# Patient Record
Sex: MALE | Race: WHITE | NOT HISPANIC OR LATINO | Employment: FULL TIME | URBAN - METROPOLITAN AREA
[De-identification: names, ages, dates, MRNs, and addresses within clinical notes are randomized per-mention and may not be internally consistent; named-entity substitution may affect disease eponyms.]

---

## 2019-05-25 ENCOUNTER — HOSPITAL ENCOUNTER (EMERGENCY)
Facility: HOSPITAL | Age: 39
Discharge: HOME | End: 2019-05-25
Attending: EMERGENCY MEDICINE | Admitting: EMERGENCY MEDICINE
Payer: OTHER MISCELLANEOUS

## 2019-05-25 VITALS
OXYGEN SATURATION: 97 % | SYSTOLIC BLOOD PRESSURE: 159 MMHG | HEART RATE: 58 BPM | TEMPERATURE: 97.8 F | RESPIRATION RATE: 18 BRPM | WEIGHT: 280 LBS | DIASTOLIC BLOOD PRESSURE: 98 MMHG

## 2019-05-25 DIAGNOSIS — S01.81XA FACIAL LACERATION: Primary | ICD-10-CM

## 2019-05-25 PROCEDURE — 99283 EMERGENCY DEPT VISIT LOW MDM: CPT

## 2019-05-25 PROCEDURE — 90471 IMMUNIZATION ADMIN: CPT

## 2019-05-25 PROCEDURE — 90715 TDAP VACCINE 7 YRS/> IM: CPT | Performed by: EMERGENCY MEDICINE

## 2019-05-25 PROCEDURE — 99283 EMERGENCY DEPT VISIT LOW MDM: CPT | Performed by: EMERGENCY MEDICINE

## 2019-05-25 RX ORDER — BACITRACIN, NEOMYCIN, POLYMYXIN B 400; 3.5; 5 [USP'U]/G; MG/G; [USP'U]/G
1 OINTMENT TOPICAL ONCE
Status: COMPLETED | OUTPATIENT
Start: 2019-05-25 | End: 2019-05-25

## 2019-05-25 RX ADMIN — BACITRACIN, NEOMYCIN, POLYMYXIN B 1 SMALL APPLICATION: 400; 3.5; 5 OINTMENT TOPICAL at 01:19

## 2019-05-25 RX ADMIN — TETANUS TOXOID, REDUCED DIPHTHERIA TOXOID AND ACELLULAR PERTUSSIS VACCINE, ADSORBED 0.5 ML: 5; 2.5; 8; 8; 2.5 SUSPENSION INTRAMUSCULAR at 01:19

## 2019-10-08 ENCOUNTER — OFFICE VISIT (OUTPATIENT)
Dept: URGENT CARE | Facility: CLINIC | Age: 39
End: 2019-10-08

## 2019-10-08 VITALS
SYSTOLIC BLOOD PRESSURE: 126 MMHG | BODY MASS INDEX: 30.84 KG/M2 | WEIGHT: 248 LBS | DIASTOLIC BLOOD PRESSURE: 72 MMHG | HEIGHT: 75 IN

## 2019-10-08 DIAGNOSIS — Z01.89 ENCOUNTER FOR TOBACCO USE SCREENING: Primary | ICD-10-CM

## 2019-10-08 DIAGNOSIS — Z13.6 SCREENING FOR CARDIOVASCULAR CONDITION: Primary | ICD-10-CM

## 2019-10-08 DIAGNOSIS — Z13.6 SCREENING FOR CARDIOVASCULAR CONDITION: ICD-10-CM

## 2019-10-08 LAB
CHOLEST SERPL-MCNC: 118 MG/DL (ref 50–200)
GLUCOSE SERPL-MCNC: 94 MG/DL (ref 65–140)
HDLC SERPL-MCNC: 49 MG/DL (ref 40–60)
LDLC SERPL CALC-MCNC: 57 MG/DL (ref 0–100)
NONHDLC SERPL-MCNC: 69 MG/DL
TRIGL SERPL-MCNC: 62 MG/DL

## 2019-10-08 PROCEDURE — 82947 ASSAY GLUCOSE BLOOD QUANT: CPT | Performed by: NURSE PRACTITIONER

## 2019-10-08 PROCEDURE — 80323 ALKALOIDS NOS: CPT | Performed by: NURSE PRACTITIONER

## 2019-10-08 PROCEDURE — 80061 LIPID PANEL: CPT | Performed by: NURSE PRACTITIONER

## 2019-10-08 NOTE — PROGRESS NOTES
Biometric screening only       /72   Ht 6' 3" (1 905 m)   Wt 112 kg (248 lb)   BMI 31 00 kg/m²   Waist: 41 25in

## 2019-10-15 LAB
COTININE SERPL-MCNC: NORMAL NG/ML
NICOTINE SERPL-MCNC: NORMAL NG/ML

## 2019-10-18 ENCOUNTER — TELEPHONE (OUTPATIENT)
Dept: URGENT CARE | Facility: CLINIC | Age: 39
End: 2019-10-18

## 2020-11-10 ENCOUNTER — OFFICE VISIT (OUTPATIENT)
Dept: URGENT CARE | Facility: CLINIC | Age: 40
End: 2020-11-10
Payer: COMMERCIAL

## 2020-11-10 VITALS
HEIGHT: 75 IN | OXYGEN SATURATION: 96 % | RESPIRATION RATE: 20 BRPM | BODY MASS INDEX: 31.08 KG/M2 | TEMPERATURE: 97.1 F | HEART RATE: 55 BPM | WEIGHT: 250 LBS | DIASTOLIC BLOOD PRESSURE: 78 MMHG | SYSTOLIC BLOOD PRESSURE: 136 MMHG

## 2020-11-10 DIAGNOSIS — Z11.59 SPECIAL SCREENING EXAMINATION FOR UNSPECIFIED VIRAL DISEASE: ICD-10-CM

## 2020-11-10 DIAGNOSIS — J20.9 ACUTE BRONCHITIS, UNSPECIFIED ORGANISM: Primary | ICD-10-CM

## 2020-11-10 PROCEDURE — U0003 INFECTIOUS AGENT DETECTION BY NUCLEIC ACID (DNA OR RNA); SEVERE ACUTE RESPIRATORY SYNDROME CORONAVIRUS 2 (SARS-COV-2) (CORONAVIRUS DISEASE [COVID-19]), AMPLIFIED PROBE TECHNIQUE, MAKING USE OF HIGH THROUGHPUT TECHNOLOGIES AS DESCRIBED BY CMS-2020-01-R: HCPCS | Performed by: PHYSICIAN ASSISTANT

## 2020-11-10 PROCEDURE — 99213 OFFICE O/P EST LOW 20 MIN: CPT | Performed by: PHYSICIAN ASSISTANT

## 2020-11-10 RX ORDER — PREDNISONE 50 MG/1
50 TABLET ORAL DAILY
Qty: 5 TABLET | Refills: 0 | Status: SHIPPED | OUTPATIENT
Start: 2020-11-10 | End: 2020-11-15

## 2020-11-11 LAB — SARS-COV-2 RNA SPEC QL NAA+PROBE: NOT DETECTED

## 2020-11-13 ENCOUNTER — TELEPHONE (OUTPATIENT)
Dept: URGENT CARE | Facility: CLINIC | Age: 40
End: 2020-11-13

## 2021-03-09 ENCOUNTER — OFFICE VISIT (OUTPATIENT)
Dept: DERMATOLOGY | Facility: CLINIC | Age: 41
End: 2021-03-09
Payer: COMMERCIAL

## 2021-03-09 VITALS — TEMPERATURE: 97 F | WEIGHT: 293 LBS | HEIGHT: 75 IN | BODY MASS INDEX: 36.43 KG/M2

## 2021-03-09 DIAGNOSIS — L90.5 SCAR: Primary | ICD-10-CM

## 2021-03-09 DIAGNOSIS — L81.4 SOLAR LENTIGO: ICD-10-CM

## 2021-03-09 DIAGNOSIS — D22.9 MULTIPLE NEVI: ICD-10-CM

## 2021-03-09 DIAGNOSIS — L57.0 ACTINIC KERATOSES: ICD-10-CM

## 2021-03-09 PROCEDURE — 99204 OFFICE O/P NEW MOD 45 MIN: CPT | Performed by: STUDENT IN AN ORGANIZED HEALTH CARE EDUCATION/TRAINING PROGRAM

## 2021-03-09 PROCEDURE — 3008F BODY MASS INDEX DOCD: CPT | Performed by: STUDENT IN AN ORGANIZED HEALTH CARE EDUCATION/TRAINING PROGRAM

## 2021-03-09 PROCEDURE — 1036F TOBACCO NON-USER: CPT | Performed by: STUDENT IN AN ORGANIZED HEALTH CARE EDUCATION/TRAINING PROGRAM

## 2021-03-09 RX ORDER — CALCIPOTRIENE 50 UG/G
CREAM TOPICAL
Qty: 60 G | Refills: 0 | Status: SHIPPED | OUTPATIENT
Start: 2021-03-09

## 2021-03-09 RX ORDER — FLUOROURACIL 50 MG/G
CREAM TOPICAL
Qty: 40 G | Refills: 0 | Status: SHIPPED | OUTPATIENT
Start: 2021-03-09

## 2021-03-09 NOTE — PROGRESS NOTES
[facility] Note     Patient Name: Quinten Dodson  Encounter Date: 03/09/2021     Have you been cared for by a [facility] Dermatologist in the last 3 years and, if so, which one? No    · Have you traveled outside of the [address] [place] [address] in the past 3 months or outside of the [facility] area in the last 2 weeks? No     May we call your Preferred Phone number to discuss your specific medical information? Yes     May we leave a detailed message that includes your specific medical information? Yes      Today's Chief Concerns:   Concern #1:  Spot on right cheek   Concern #2:      Past Medical History:  Have you personally ever had or currently have any of the following? · Skin cancer (such as Melanoma, Basal Cell Carcinoma, Squamous Cell Carcinoma? (If Yes, please provide more detail)- No  · Eczema: No  · Psoriasis: No  · HIV/AIDS: No  · Hepatitis B or C: No  · Tuberculosis: No  · Systemic Immunosuppression such as Diabetes, Biologic or Immunotherapy, Chemotherapy, Organ Transplantation, Bone Marrow Transplantation (If YES, please provide more detail): No  · Radiation Treatment (If YES, please provide more detail): No  · Any other major medical conditions/concerns? (If Yes, which types)- YES, asthma    Social History:     What is/was your primary occupation?      What are your hobbies/past-times? Family History:  Have any of your "first degree relatives" (parent, brother, sister, or child) had any of the following       · Skin cancer such as Melanoma or Merkel Cell Carcinoma or Pancreatic Cancer? No  · Eczema, Asthma, Hay Fever or Seasonal Allergies: No  · Psoriasis or Psoriatic Arthritis: No  · Do any other medical conditions seem to run in your family? If Yes, what condition and which relatives?   YES, mother:marco a     Current Medications:   (please update all dermatological medications before printing patient's AVS!)      Current Outpatient Medications:     fluticasone-salmeterol (ADVAIR, WIXELA) 100-50 mcg/dose inhaler, Inhale 1 puff 2 (two) times a day Rinse mouth after use , Disp: , Rfl:       Review of Systems:  Have you recently had or currently have any of the following? If YES, what are you doing for the problem? · Fever, chills or unintended weight loss: No  · Sudden loss or change in your vision: No  · Nausea, vomiting or blood in your stool: No  · Painful or swollen joints: No  · Wheezing or cough: No  · Changing mole or non-healing wound: No  · Nosebleeds: No  · Excessive sweating: No  · Easy or prolonged bleeding? No  · Over the last 2 weeks, how often have you been bothered by the following problems? · Taking little interest or pleasure in doing things: 1 - Not at All  · Feeling down, depressed, or hopeless: 1 - Not at All  · Rapid heartbeat with epinephrine:  No    · FEMALES ONLY:    · Are you pregnant or planning to become pregnant? N/A  · Are you currently or planning to be nursing or breast feeding? N/A    · Any known allergies? Allergies   Allergen Reactions    Amoxicillin     Ceclor [Cefaclor]          Physical Exam:     Was a chaperone (Derm Clinical Assistant) present throughout the entire Physical Exam? Yes     Did the Dermatology Team specifically  the patient on the importance of a Full Skin Exam to be sure that nothing is missed clinically?  Yes}  o Did the patient ultimately request or accept a Full Skin Exam?  Yes  o Did the patient specifically refuse to have the areas "under-the-bra" examined by the Dermatologist? No  o Did the patient specifically refuse to have the areas "under-the-underwear" examined by the Dermatologist? No    CONSTITUTIONAL:   Vitals:    03/09/21 1316   Temp: (!) 97 °F (36 1 °C)   Weight: 133 kg (293 lb)   Height: 6' 3" (1 905 m)           PSYCH: Normal mood and affect  EYES: Normal conjunctiva  ENT: Normal lips and oral mucosa  CARDIOVASCULAR: No edema  RESPIRATORY: Normal respirations  HEME/LYMPH/IMMUNO:  No regional lymphadenopathy except as noted below in "ASSESSMENT AND PLAN BY DIAGNOSIS"    SKIN:  FULL ORGAN SYSTEM EXAM   Hair, Scalp, Ears, Face Normal except as noted below in Assessment   Neck, Cervical Chain Nodes Normal except as noted below in Assessment   Right Arm/Hand/Fingers Normal except as noted below in Assessment   Left Arm/Hand/Fingers Normal except as noted below in Assessment   Chest/Breasts/Axillae Viewed areas Normal except as noted below in Assessment   Abdomen, Umbilicus Normal except as noted below in Assessment   Back/Spine Normal except as noted below in Assessment   Groin/Genitalia/Buttocks NOT EXAMINED   Right Leg, Foot, Toes Normal except as noted below in Assessment   Left Leg, Foot, Toes Normal except as noted below in Assessment        Assessment and Plan by Diagnosis:    History of Present Condition:     Duration:  How long has this been an issue for you?    o  few years   Location Affected:  Where on the body is this affecting you?    o  right cheek   Quality:  Is there any bleeding, pain, itch, burning/irritation, or redness associated with the skin lesion? o  denies   Severity:  Describe any bleeding, pain, itch, burning/irritation, or redness on a scale of 1 to 10 (with 10 being the worst)  o  n/a   Timing:  Does this condition seem to be there pretty constantly or do you notice it more at specific times throughout the day?     o  comes and goes   Context:  Have you ever noticed that this condition seems to be associated with specific activities you do?    o  mimi   Modifying Factors:    o Anything that seems to make the condition worse?    -  sunlight  o What have you tried to do to make the condition better?    -  denies   Associated Signs and Symptoms:  Does this skin lesion seem to be associated with any of the following:  o  SL AMB DERM SIGNS AND SYMPTOMS: Skin color changes     HYPOPIGMENTED SCAR  Physical Exam:   Anatomic Location Affected:  Right cheek   Morphological Description:  hypopigmented atrophic scar with surrounding light tan pigment and background erythema no pigment network under dermatoscopy, similar change in pigmentation on left cheek   Pertinent Positives:   Pertinent Negatives: Additional History of Present Condition:  Patient stats he had the spot over the years and notices it becomes darker with sun exposure  Assessment and Plan:  Based on a thorough discussion of this condition and the management approach to it (including a comprehensive discussion of the known risks, side effects and potential benefits of treatment), the patient (family) agrees to implement the following specific plan:   Discussed possible AK in past although at this time discussed scar is accentuating change in pigmentation he has on this cheek   Discussed cryotherapy vs topical field therapy if spot becomes scaly        SOLAR LENTIGINES      Physical Exam:   Anatomic Location Affected:  Sun exposed areas of back, chest, arms, legs   Morphological Description:  Multiple scattered brown to tan evenly pigmented macules    Denies pain, itch, bleeding  No treatments tried  Present for months - years  Reports getting newer lesions with sun exposure  Assessment and Plan:  Based on a thorough discussion of this condition and the management approach to it (including a comprehensive discussion of the known risks, side effects and potential benefits of treatment), the patient (family) agrees to implement the following specific plan:  · Reassure benign  · Use sun protection  Apply SPF 30 or higher at least three times a day  Wear sun protecting clothing and hats           MULTIPLE MELANOCYTIC NEVI ("Moles")     Physical Exam:  · Anatomic Location Affected: Trunk and extremities  · Morphological Description:  Scattered, round to ovoid, symmetrical-appearing, even bordered, skin colored to dark brown macules/papules  · Left buttocks, 3 x 2 5 mm blue macule   · Denies pain, itch, bleeding  No treatments tried  Present for years  Present constantly; no modifying factors which make it worse or better  Denies actively changing or growing moles  Assessment and Plan:  Based on a thorough discussion of this condition and the management approach to it (including a comprehensive discussion of the known risks, side effects and potential benefits of treatment), the patient (family) agrees to implement the following specific plan:  · Reassure benign  · Monitor for changes  · Use sun protection  Apply SPF 30 or higher at least three times a day  Wear sun protecting clothing and hats  Worrisome signs of skin malignancy discussed, questions answered  Regular self-skin check discussed  Advised to call or return to office if patient notices any spots of concern, rapidly growing/changing lesions, bleeding lesions, non-healing lesions  Advised regular SPF use  ACTINIC KERATOSIS    Physical Exam:   Anatomic Location Affected:  Forehead, bilateral cheeks and nose   Morphological Description:  Red scaly thin gritty papules    Additional History of Present Condition:  Patient denies any family history of skin cancer    Assessment and Plan:  Based on a thorough discussion of this condition and the management approach to it (including a comprehensive discussion of the known risks, side effects and potential benefits of treatment), the patient (family) agrees to implement the following specific plan:     Discussed LN2 vs topical field therapy  Elect for field therapy   Start Efudex cream and calcipotriene cream- mix equal amounts of both in hand and apply to forehead, cheeks and nose  Avoid applying anything else to treated area for 1 hour after  Wash hands after use  Keep out of reach of children and pets  Use for 10 days then stop   If you get open areas on treated areas, then use Vaseline until healed       Actinic keratoses are very common on sites repeatedly exposed to the sun, especially the backs of the hands and the face  They are considered precancers and have a low risk of turning into squamous cell carcinoma  It is rare for a solitary actinic keratosis to evolve into a squamous cell carcinoma (SCC), but the risk is 10-15% when more than 10 actinic keratoses are present  A tender, thickened, ulcerated or enlarging actinic keratosis is suspicious of SCC  Actinic keratoses may be prevented by strict sun protection  If already present, keratoses may improve with a very high sun protection factor (50+) broad-spectrum sunscreen applied at least daily to affected areas, year-round  We recommend that sun protective clothing and hats and sunglasses be worn whenever possible  Note that you can make you own UPF 30 rate clothing using just your own washing machine with a product called sun guard    EFUDEX (5-Fluorouracil, 5-U)     1  Efudex is a chemotherapy medication when taken by mouth; however, in dermatology we use it to treat skin conditions such as warts, some skin cancers and most commonly actinic (solar) keratoses  When applied topically the medication is not absorbed into the bloodstream and does not cause typical chemotherapy side effects  It is safe to your body  2  Efudex works by destroying the damaged cells on your skin  As a result it causes redness, irritation, and scabbing  This is a normal part of therapeutic skin response to Efudex  3  The treated areas need to be completely protected from the sun during the treatment and the recovery process  If you need to go out into the sun, cover the area with a hat, long sleeve shirts, gloves, etc    4  Treatment approaches very depending on the condition that is being treated  Your doctor will specify which approach is right for you     Instructions for Use :  Continuous and daily therapy over a region of the skin   ·  This approach is used when you have widespread involvement over an area   ·  Efudex cream and calcipotriene cream- mix equal amounts of both in hand and apply to forehead, cheeks and nose  Avoid applying anything else to treated area for 1 hour after  Wash hands after use  Keep out of reach of children and pets  Use for 10 days then stop  ·  One hour or so after application of Efudex, use plain VASELINE or AQUAPHOR  over entire treated area  This is especially important once you start to have areas react to the Efudex (red, open areas)  It helps treatment be more tolerable and speeds healing! What to Expect During Therapy   ·  Redness is caused by inflammation and destruction of the abnormal cells and indicates your lesions are responding to the treatment  You may use an over-the-counter 1% hydrocortisone ointment to decrease redness, although the treatment may be somewhat more effective if you are able to avoid cortisone use  It takes 2-4 weeks for the redness to fade after stopping the Efudex; most of the redness resolves over the first 1-2 weeks  ·  Possible Crusting and peeling occurs as the sun-damaged skin is removed to allow for replacement by normal skin  Cool washcloth soaks (washcloth soaked with cool water over face for 15-20 minutes 3-4 times per day) can help as well as a moisturizing ointment, such as Vaseline or Aquaphor ointment  It is preferable to be moisturized rather than have the skin be dry  It is fine to wash your face with plain water or a mild cleanser such as Dove or Cetaphil during your treatment course  ·  Itching and tenderness can be controlled with acetaminophen (Tylenol) or non-steroidal anti- inflammatory medication (ibuprofen, naproxen)   In Addition   ·  You can apply the Efudex with a Q-tip (if spot treating), gloves or fingers  If fingers are used, however, remember to wash your hands thoroughly afterwards to avoid irritation on normal skin     ·  Care must be taken with Efudex during logan months because the medicine is reactive with the sun   ·  Do NOT use Efudex on eyelids or lips unless specifically directed to do so  Keep Efudex away from pets and kids it IS a chemotherapy medication when taken by mouth and we do not want them eating the medicine accidentally! ·  Care must be taken with Efudex in skin fold areas like the fold from the nose to the corner of the mouth  Try to avoid having any Efudex accumulate in those areas  ·  Not all bumps will respond to the Efudex  There are benign types of keratoses that will not react to the medication (such as seborrheic keratoses)  If the area treated is not getting red it does not mean the medicine isn't working  ·  Consider the Efudex as an investment in the health of your skin; the benefits of sticking it out are worth it! Efudex is an excellent way to reverse many years of sun damage  When to contact your provider   ·  Once you have completed your prescribed course of therapy, wait for a period of 4 weeks to see if treated areas resolve  If you have lesions that have not responded, make a follow-up appointment with your doctor in about 8 weeks  In evaluating unresponsive areas your doctor will need the skin completely settled down this usually takes a period of at least two months  ·  If having extensive burning, itching, swelling or tenderness call the Dermatology Department to arrange an Efudex check with our nurse, who has extensive experience with Efudex treatment  Although the Efudex treatment sometimes produces dramatic redness, crusting and peeling, problems such as allergic reactions and infection are rare        If you have any questions please call our office at [phone]          Scribe Attestation    I,:  [employer] am acting as a scribe while in the presence of the attending physician :       I,:  Yasir Martin MD personally performed the services described in this documentation    as scribed in my presence :

## 2021-03-09 NOTE — PATIENT INSTRUCTIONS
Based on a thorough discussion of this condition and the management approach to it (including a comprehensive discussion of the known risks, side effects and potential benefits of treatment), the patient (family) agrees to implement the following specific plan:     Discussed LN2 vs topical field therapy  Elect for field therapy   Start Efudex cream and calcipotriene cream- mix equal amounts of both in hand and apply to forehead, cheeks and nose  Avoid applying anything else to treated area for 1 hour after  Wash hands after use  Keep out of reach of children and pets  Use for 10 days then stop   If you get open areas on treated areas, then use Vaseline until healed  Actinic keratoses are very common on sites repeatedly exposed to the sun, especially the backs of the hands and the face  They are considered precancers and have a low risk of turning into squamous cell carcinoma  It is rare for a solitary actinic keratosis to evolve into a squamous cell carcinoma (SCC), but the risk is 10-15% when more than 10 actinic keratoses are present  A tender, thickened, ulcerated or enlarging actinic keratosis is suspicious of SCC  Actinic keratoses may be prevented by strict sun protection  If already present, keratoses may improve with a very high sun protection factor (50+) broad-spectrum sunscreen applied at least daily to affected areas, year-round  We recommend that sun protective clothing and hats and sunglasses be worn whenever possible  Note that you can make you own UPF 30 rate clothing using just your own washing machine with a product called sun guard    EFUDEX (5-Fluorouracil, 5-U)     1  Efudex is a chemotherapy medication when taken by mouth; however, in dermatology we use it to treat skin conditions such as warts, some skin cancers and most commonly actinic (solar) keratoses   When applied topically the medication is not absorbed into the bloodstream and does not cause typical chemotherapy side effects  It is safe to your body  2  Efudex works by destroying the damaged cells on your skin  As a result it causes redness, irritation, and scabbing  This is a normal part of therapeutic skin response to Efudex  3  The treated areas need to be completely protected from the sun during the treatment and the recovery process  If you need to go out into the sun, cover the area with a hat, long sleeve shirts, gloves, etc    4  Treatment approaches very depending on the condition that is being treated  Your doctor will specify which approach is right for you  Instructions for Use :  Continuous and daily therapy over a region of the skin   ·  This approach is used when you have widespread involvement over an area   ·  Efudex cream and calcipotriene cream- mix equal amounts of both in hand and apply to forehead, cheeks and nose  Avoid applying anything else to treated area for 1 hour after  Wash hands after use  Keep out of reach of children and pets  Use for 10 days then stop  ·  One hour or so after application of Efudex, use plain VASELINE or AQUAPHOR  over entire treated area  This is especially important once you start to have areas react to the Efudex (red, open areas)  It helps treatment be more tolerable and speeds healing! What to Expect During Therapy   ·  Redness is caused by inflammation and destruction of the abnormal cells and indicates your lesions are responding to the treatment  You may use an over-the-counter 1% hydrocortisone ointment to decrease redness, although the treatment may be somewhat more effective if you are able to avoid cortisone use  It takes 2-4 weeks for the redness to fade after stopping the Efudex; most of the redness resolves over the first 1-2 weeks  ·  Possible Crusting and peeling occurs as the sun-damaged skin is removed to allow for replacement by normal skin   Cool washcloth soaks (washcloth soaked with cool water over face for 15-20 minutes 3-4 times per day) can help as well as a moisturizing ointment, such as Vaseline or Aquaphor ointment  It is preferable to be moisturized rather than have the skin be dry  It is fine to wash your face with plain water or a mild cleanser such as Dove or Cetaphil during your treatment course  ·  Itching and tenderness can be controlled with acetaminophen (Tylenol) or non-steroidal anti- inflammatory medication (ibuprofen, naproxen)   In Addition   ·  You can apply the Efudex with a Q-tip (if spot treating), gloves or fingers  If fingers are used, however, remember to wash your hands thoroughly afterwards to avoid irritation on normal skin  ·  Care must be taken with Efudex during logan months because the medicine is reactive with the sun  ·  Do NOT use Efudex on eyelids or lips unless specifically directed to do so  Keep Efudex away from pets and kids it IS a chemotherapy medication when taken by mouth and we do not want them eating the medicine accidentally! ·  Care must be taken with Efudex in skin fold areas like the fold from the nose to the corner of the mouth  Try to avoid having any Efudex accumulate in those areas  ·  Not all bumps will respond to the Efudex  There are benign types of keratoses that will not react to the medication (such as seborrheic keratoses)  If the area treated is not getting red it does not mean the medicine isn't working  ·  Consider the Efudex as an investment in the health of your skin; the benefits of sticking it out are worth it! Efudex is an excellent way to reverse many years of sun damage  When to contact your provider   ·  Once you have completed your prescribed course of therapy, wait for a period of 4 weeks to see if treated areas resolve  If you have lesions that have not responded, make a follow-up appointment with your doctor in about 8 weeks   In evaluating unresponsive areas your doctor will need the skin completely settled down this usually takes a period of at least two months  ·  If having extensive burning, itching, swelling or tenderness call the Dermatology Department to arrange an Efudex check with our nurse, who has extensive experience with Efudex treatment  Although the Efudex treatment sometimes produces dramatic redness, crusting and peeling, problems such as allergic reactions and infection are rare   If you have any questions please call our office at [phone]    Actinic keratoses are very common on sites repeatedly exposed to the sun, especially the backs of the hands and the face  They are considered precancers and have a low risk of turning into squamous cell carcinoma  It is rare for a solitary actinic keratosis to evolve into a squamous cell carcinoma (SCC), but the risk is 10-15% when more than 10 actinic keratoses are present  A tender, thickened, ulcerated or enlarging actinic keratosis is suspicious of SCC  Actinic keratoses may be prevented by strict sun protection  If already present, keratoses may improve with a very high sun protection factor (50+) broad-spectrum sunscreen applied at least daily to affected areas, year-round  We recommend that sun protective clothing and hats and sunglasses be worn whenever possible    Note that you can make you own UPF 30 rate clothing using just your own washing machine with a product called sun guard    There are several different options for treating actinic keratoses     Topical medications such as 5-fluorouracil or Aldara  - good for field treatment ie treats what's seen and not seen     Cryotherapy - good for single spots but treats [facility] what [facility] see versus a field treatment     Photodynamic therapy - involves application of a light sensitizing medicine and then exposure to a special light, also a good field treatment    MULTIPLE MELANOCYTIC NEVI ("Moles")     Assessment and Plan:  Based on a thorough discussion of this condition and the management approach to it (including a comprehensive discussion of the known risks, side effects and potential benefits of treatment), the patient (family) agrees to implement the following specific plan:  · Reassure benign  · Monitor for changes  · Use sun protection  Apply SPF 30 or higher at least three times a day  Wear sun protecting clothing and hats  Worrisome signs of skin malignancy discussed, questions answered  Regular self-skin check discussed  Advised to call or return to office if patient notices any spots of concern, rapidly growing/changing lesions, bleeding lesions, non-healing lesions  Advised regular SPF use

## 2021-03-10 DIAGNOSIS — Z23 ENCOUNTER FOR IMMUNIZATION: ICD-10-CM

## 2021-03-16 ENCOUNTER — IMMUNIZATIONS (OUTPATIENT)
Dept: FAMILY MEDICINE CLINIC | Facility: HOSPITAL | Age: 41
End: 2021-03-16

## 2021-03-16 DIAGNOSIS — Z23 ENCOUNTER FOR IMMUNIZATION: Primary | ICD-10-CM

## 2021-03-16 PROCEDURE — 0001A SARS-COV-2 / COVID-19 MRNA VACCINE (PFIZER-BIONTECH) 30 MCG: CPT

## 2021-03-16 PROCEDURE — 91300 SARS-COV-2 / COVID-19 MRNA VACCINE (PFIZER-BIONTECH) 30 MCG: CPT

## 2021-04-10 ENCOUNTER — IMMUNIZATIONS (OUTPATIENT)
Dept: FAMILY MEDICINE CLINIC | Facility: HOSPITAL | Age: 41
End: 2021-04-10

## 2021-04-10 DIAGNOSIS — Z23 ENCOUNTER FOR IMMUNIZATION: Primary | ICD-10-CM

## 2021-04-10 PROCEDURE — 91300 SARS-COV-2 / COVID-19 MRNA VACCINE (PFIZER-BIONTECH) 30 MCG: CPT

## 2021-04-10 PROCEDURE — 0002A SARS-COV-2 / COVID-19 MRNA VACCINE (PFIZER-BIONTECH) 30 MCG: CPT

## 2021-08-23 ENCOUNTER — APPOINTMENT (EMERGENCY)
Dept: RADIOLOGY | Facility: HOSPITAL | Age: 41
End: 2021-08-23
Payer: COMMERCIAL

## 2021-08-23 ENCOUNTER — HOSPITAL ENCOUNTER (EMERGENCY)
Facility: HOSPITAL | Age: 41
Discharge: HOME | End: 2021-08-23
Attending: EMERGENCY MEDICINE | Admitting: EMERGENCY MEDICINE
Payer: COMMERCIAL

## 2021-08-23 VITALS
SYSTOLIC BLOOD PRESSURE: 142 MMHG | DIASTOLIC BLOOD PRESSURE: 85 MMHG | BODY MASS INDEX: 35 KG/M2 | WEIGHT: 280 LBS | TEMPERATURE: 98.2 F | RESPIRATION RATE: 16 BRPM | HEART RATE: 61 BPM | OXYGEN SATURATION: 97 %

## 2021-08-23 DIAGNOSIS — S60.511A ABRASION OF SKIN OF RIGHT HAND: ICD-10-CM
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
## 2021-08-23 DIAGNOSIS — S81.812A LACERATION OF LEFT LOWER LEG, INITIAL ENCOUNTER: ICD-10-CM

## 2021-08-23 PROCEDURE — 73110 X-RAY EXAM OF WRIST: CPT

## 2021-08-23 PROCEDURE — 99284 EMERGENCY DEPT VISIT MOD MDM: CPT

## 2021-08-23 PROCEDURE — 99282 EMERGENCY DEPT VISIT SF MDM: CPT | Performed by: EMERGENCY MEDICINE

## 2021-08-23 PROCEDURE — 73564 X-RAY EXAM KNEE 4 OR MORE: CPT

## 2021-08-23 PROCEDURE — 12001 RPR S/N/AX/GEN/TRNK 2.5CM/<: CPT | Performed by: EMERGENCY MEDICINE

## 2021-08-23 RX ORDER — BACITRACIN, NEOMYCIN, POLYMYXIN B 400; 3.5; 5 [USP'U]/G; MG/G; [USP'U]/G
1 OINTMENT TOPICAL ONCE
Status: COMPLETED | OUTPATIENT
Start: 2021-08-23 | End: 2021-08-23

## 2021-08-23 RX ORDER — ALBUTEROL SULFATE 90 UG/1
2 AEROSOL, METERED RESPIRATORY (INHALATION) AS NEEDED
COMMUNITY
Start: 2021-06-01

## 2021-08-23 RX ORDER — LIDOCAINE HYDROCHLORIDE AND EPINEPHRINE 10; 10 MG/ML; UG/ML
10 INJECTION, SOLUTION INFILTRATION; PERINEURAL ONCE
Status: COMPLETED | OUTPATIENT
Start: 2021-08-23 | End: 2021-08-23

## 2021-08-23 RX ADMIN — LIDOCAINE HYDROCHLORIDE,EPINEPHRINE BITARTRATE 10 ML: 10; .01 INJECTION, SOLUTION INFILTRATION; PERINEURAL at 14:30

## 2021-08-23 RX ADMIN — BACITRACIN ZINC, NEOMYCIN, POLYMYXIN B SULFAT 1 SMALL APPLICATION: 5000; 3.5; 4 OINTMENT TOPICAL at 17:07
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
## 2021-08-23 NOTE — DISCHARGE INSTRUCTIONS
You have been seen for hand and leg pain  Your received six stitches in your left leg  Please arrange for removal in 7-10 days  Return to the emergency department if you develop worsening pain, weakness/numbness/tingling or any other symptoms of concern  Please follow up with your PCP and orthopedics as needed by calling the number provided
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
## 2025-08-21 ENCOUNTER — OFFICE VISIT (OUTPATIENT)
Dept: URGENT CARE | Facility: CLINIC | Age: 45
End: 2025-08-21
Payer: COMMERCIAL

## 2025-08-21 VITALS
OXYGEN SATURATION: 97 % | HEART RATE: 74 BPM | DIASTOLIC BLOOD PRESSURE: 82 MMHG | SYSTOLIC BLOOD PRESSURE: 142 MMHG | TEMPERATURE: 97.7 F | RESPIRATION RATE: 20 BRPM

## 2025-08-21 DIAGNOSIS — T63.443A BEE STING, ASSAULT, INITIAL ENCOUNTER: Primary | ICD-10-CM

## 2025-08-21 PROCEDURE — 96372 THER/PROPH/DIAG INJ SC/IM: CPT | Performed by: PHYSICIAN ASSISTANT

## 2025-08-21 PROCEDURE — 99203 OFFICE O/P NEW LOW 30 MIN: CPT | Performed by: PHYSICIAN ASSISTANT

## 2025-08-21 RX ORDER — METHYLPREDNISOLONE 4 MG/1
TABLET ORAL
Qty: 21 TABLET | Refills: 0 | Status: SHIPPED | OUTPATIENT
Start: 2025-08-21

## 2025-08-21 RX ORDER — METHYLPREDNISOLONE SODIUM SUCCINATE 40 MG/ML
40 INJECTION, POWDER, LYOPHILIZED, FOR SOLUTION INTRAMUSCULAR; INTRAVENOUS ONCE
Status: COMPLETED | OUTPATIENT
Start: 2025-08-21 | End: 2025-08-21

## 2025-08-21 RX ADMIN — METHYLPREDNISOLONE SODIUM SUCCINATE 40 MG: 40 INJECTION, POWDER, LYOPHILIZED, FOR SOLUTION INTRAMUSCULAR; INTRAVENOUS at 15:53
